# Patient Record
Sex: MALE | Race: WHITE | NOT HISPANIC OR LATINO | Employment: OTHER | URBAN - NONMETROPOLITAN AREA
[De-identification: names, ages, dates, MRNs, and addresses within clinical notes are randomized per-mention and may not be internally consistent; named-entity substitution may affect disease eponyms.]

---

## 2024-03-30 ENCOUNTER — HOSPITAL ENCOUNTER (EMERGENCY)
Facility: HOSPITAL | Age: 59
Discharge: HOME | End: 2024-03-30
Attending: PHYSICIAN ASSISTANT | Admitting: PHYSICIAN ASSISTANT
Payer: COMMERCIAL

## 2024-03-30 VITALS
TEMPERATURE: 97.2 F | DIASTOLIC BLOOD PRESSURE: 76 MMHG | OXYGEN SATURATION: 98 % | RESPIRATION RATE: 18 BRPM | WEIGHT: 224 LBS | HEART RATE: 77 BPM | SYSTOLIC BLOOD PRESSURE: 111 MMHG

## 2024-03-30 DIAGNOSIS — J06.9 URI (UPPER RESPIRATORY INFECTION): ICD-10-CM

## 2024-03-30 PROCEDURE — 99213 OFFICE O/P EST LOW 20 MIN: CPT | Performed by: PHYSICIAN ASSISTANT

## 2024-03-30 PROCEDURE — G0463 HOSPITAL OUTPT CLINIC VISIT: HCPCS

## 2024-03-30 ASSESSMENT — ENCOUNTER SYMPTOMS
COUGH: 1
ABDOMINAL PAIN: 0
SHORTNESS OF BREATH: 0
SINUS PRESSURE: 0
SORE THROAT: 1

## 2024-03-30 ASSESSMENT — ACTIVITIES OF DAILY LIVING (ADL): ADLS_ACUITY_SCORE: 37

## 2024-03-30 NOTE — DISCHARGE INSTRUCTIONS
Due to acute onset of symptoms and only a few days, suspect to have a viral cause for your upper respiratory infection/bronchitis    No antibiotics at this time.  Over-the-counter cold/flu medications along with Tylenol/ibuprofen for pain control  Encourage warm fluids for symptom relief with swallowing    Return to the urgent care/ER or contact your PCP for further evaluation/consider chest x-ray if symptoms are worsening or not improving after couple weeks

## 2024-03-30 NOTE — ED TRIAGE NOTES
Pt presents with c/o having an increased productive cough and nasal congestion   Reports that eyes are also burning and throat hurts   Pt denies wanting multiplex done   S/x started 2 days ago   Denies any fevers or SOB   Reports had mucinex today and yesterday

## 2024-03-30 NOTE — ED PROVIDER NOTES
"  History     Chief Complaint   Patient presents with    Cough     HPI  Billy Raymundo is a 58 year old male who presents to the urgent care due to productive cough, nasal congestion, sore throat.  Onset x 2 days ago.  Denies fevers, chills, sweats, dyspnea, shortness of breath.  He is using Mucinex and over-the-counter medications as needed.    Of note, has prior bronchitis infections that were fairly significant \"this is nothing like that yet\".  No prior pneumonia history.    Allergies:  Allergies   Allergen Reactions    Ampicillin     Quinine        Problem List:    Patient Active Problem List    Diagnosis Date Noted    Atrial fibrillation (H) 01/19/2014     Priority: Medium    Paroxysmal atrial fibrillation (H) 01/19/2014     Priority: Medium    Kidney stone      Priority: Medium        Past Medical History:    Past Medical History:   Diagnosis Date    Atrial fibrillation (H)     Chronic kidney disease     Hypertension     Kidney stone     Type 2 diabetes mellitus without complications (H)        Past Surgical History:    Past Surgical History:   Procedure Laterality Date    TONSILLECTOMY      he thinks but he's not sure       Family History:    Family History   Problem Relation Age of Onset    Cancer Mother     High cholesterol Mother     Diabetes Father     Eye Disorder Father     Kidney Disease Brother        Social History:  Marital Status:   [2]  Social History     Tobacco Use    Smoking status: Never   Substance Use Topics    Alcohol use: Yes     Comment: drank \"too much\" last night but otherwise occasional use    Drug use: No        Medications:    aspirin 81 MG tablet  Atorvastatin Calcium (LIPITOR PO)  HYDROCHLOROTHIAZIDE PO  metoprolol (LOPRESSOR) 25 MG tablet  ondansetron (ZOFRAN ODT) 8 MG disintegrating tablet          Review of Systems   HENT:  Positive for congestion and sore throat. Negative for sinus pressure.    Respiratory:  Positive for cough. Negative for shortness of breath.  "   Cardiovascular:  Negative for chest pain.   Gastrointestinal:  Negative for abdominal pain.       Physical Exam   BP: 111/76  Pulse: 77  Temp: 97.2  F (36.2  C)  Resp: 18  Weight: 101.6 kg (224 lb)  SpO2: 98 %      Physical Exam  Vitals and nursing note reviewed.   Constitutional:       Appearance: Normal appearance.   HENT:      Head: Normocephalic.      Right Ear: Tympanic membrane normal.      Left Ear: Tympanic membrane normal.      Nose: Congestion present.      Mouth/Throat:      Mouth: Mucous membranes are moist.      Pharynx: Posterior oropharyngeal erythema present.   Eyes:      Conjunctiva/sclera: Conjunctivae normal.   Cardiovascular:      Rate and Rhythm: Normal rate and regular rhythm.   Pulmonary:      Effort: Pulmonary effort is normal. No respiratory distress.      Breath sounds: Normal breath sounds. No stridor.   Musculoskeletal:      Cervical back: Normal range of motion.   Lymphadenopathy:      Cervical: Cervical adenopathy present.   Skin:     General: Skin is warm and dry.   Neurological:      General: No focal deficit present.      Mental Status: He is alert.   Psychiatric:         Mood and Affect: Mood normal.           Assessments & Plan (with Medical Decision Making)   Billy Raymundo presents to the Urgent Care with diagnosis of upper respiratory infection.    -2-day onset of symptoms, attributed to viral infection at this time  -Reports history of severe bronchitis in the past, but symptoms are not nearly as close this time.  -No labs warranted, as patient not having flulike symptoms or strep throat symptoms.    -Suspect viral URI/bronchitis  -Conservative management with rest, over-the-counter symptom relief    -Patient verbally educated on their diagnoses and has no urther questions or concerns  -Patient understands to seek further medical evaluation if symptoms worsen      -Follow-Up: PCP as needed      I have reviewed the nursing notes.    I have reviewed the findings, diagnosis,  plan and need for follow up with the patient.  Discharge Medication List as of 3/30/2024  2:05 PM          Final diagnoses:   URI (upper respiratory infection)       3/30/2024   [facility]       Bogdan Hendricks PA-C  03/30/24 [id number]